# Patient Record
(demographics unavailable — no encounter records)

---

## 2017-02-05 NOTE — ERNOTE
ENT HPI


Date of Service: 17


Presenting Symptoms: dental pain, other


Time Seen by Provider: 17 20:33


Exam Limitations: no limitations





- Immun/Allergies/Home Medications


Immunizations: 





 IMMUNIZATION HX





Immunizations Up to Date         Yes


History of Influenza Vaccine     No


Hx Pneumococcal Vaccination      Yes








Allergies/Adverse Reactions: 


 Allergies











Allergy/AdvReac Type Severity Reaction Status Date / Time


 


NSAIDS (Non-Steroidal Allergy Severe Swelling Verified 10/01/16 16:02





Anti-Inflamma   of Tongue  


 


phenazopyridine HCl Allergy  Swelling Verified 10/01/16 16:02





[From Pyridium]   of Throat  


 


promethazine HCl Allergy  rash Verified 10/01/16 16:02





[From Phenergan]     


 


rivaroxaban [From Xarelto] Allergy   Verified 17 19:02


 


lorazepam [From Ativan] AdvReac  halucinatio Verified 10/01/16 16:02





   n  











Home Medications: 


HOME MEDICATIONS





Diphenoxylate HCl/Atrop Sulf [Lomotil] 2.5 mg PO QID PRN 16 [Last Taken 

Unknown]


Mv,Ca,Min/Iron Fum/FA/Vit K [Essential Woman Tablet] 1 each PO DAILY 16 [

Last Taken Unknown]


HYDROcodone/ACETAMINOPHEN [Norco 5-325] 1 - 2 tab PO Q6H PRN #10 tab 17 [

Last Taken Unknown]


Penicillin V Potassium [Pen-Vee K] 500 mg PO QID #40 tab 17 [Last Taken 

Unknown]


Prednisone [Deltasone] 10 mg PO DAILY 17 [Last Taken Unknown]











- History of Present Illness


Narrative: 





pain in tooth number 16 for three days. 








Review of Systems





- Review of Systems


Constitutional: Present: See HPI


EYE: Present: see HPI


ENT: Present: See HPI


Respiratory: Present: no symptoms reported


Cardiology: Present: no symptoms reported





- Patient's Past Medical History


Patient History - Medical: Kidney stone, UTI'S, Other


Patient History - Cardiac/Respiratory: Pneumonia


Patient History - Cancer: No Hx of Cancer


Patient History - Surgical Procedures: Appendectomy, Cholecystectomy, 

Hysterectomy


Patient History - Other: None





- Family History


  ** Mother


Family History - Medical: 


Family History - Cardiac/Respiratory: No pertinent hx





  ** Father


Family History - Medical: 


Family History - Cardiac/Respiratory: No pertinent hx





- Social History


Living Situations: home


Abuse History: No History of abuse


Psych History: No pertinent hx


Smoking Status: Never smoker


Alcohol Use: none


Drug Use: none





- Immunizations


Immunizations Up to Date: Yes


Hx Pneumococcal Vaccination: Yes


History of Influenza Vaccine: No





Physical Exam





- Physical Exam


General Appearance: Present: wd/wn, alert, no apparent distress


Ears, Nose, Throat: Present: other - pt does have swelling of the ginigiva in 

area of tooth number 17, 16. no fractures noted. poor dentition. missing tooth 

number 16 noted.


Neck: Present: normal inspection, nontender


Respiratory: Present: no respiratory distress, normal breath sounds, no 

accessory muscle use, chest nontender


Cardiovascular/Chest: Present: regular rate, rhythm, no murmur, normal 

peripheral pulses





ED Progress





- Vital Signs


Patient's Vital Signs:: I have reviewed the patient's vital signs.


Vital Signs: 





 Vital Signs











  17





  19:00


 


Temperature 37.1 C


 


Pulse Rate 97


 


Respiratory 18





Rate 


 


Blood Pressure 156/91


 


O2 Sat by Pulse 98





Oximetry 














- Progress/Reassessment


Chief Complaint: Dental Problem





Departure


Clinical Impression: 


 Tooth ache








- Departure


Disposition: Home self-care


Condition: Good


Instructions:  Dental Care and Dentist Visits


Referrals: 


Jesse Ceballos MD [Primary Care Provider] - 


Prescriptions: 


HYDROcodone/ACETAMINOPHEN [Norco 5-325] 1 - 2 tab PO Q6H PRN #10 tab


 PRN Reason: Pain


Penicillin V Potassium [Pen-Vee K] 500 mg PO QID #40 tab

## 2017-04-09 NOTE — XMS REPORT
Continuity of Care Document

 Created on:2017



Patient:Em Monge

Sex:Female

:1968

External Reference #:[id number]





Demographics







 Address  [address]



   [place] [address]

 

 Home Phone  [phone]

 

 Preferred Language  Unknown

 

 Marital Status  Unknown

 

 Restoration Affiliation  Taoism

 

 Race  White

 

 Ethnic Group  Unknown









Author







 Organization  [facility] ([facility])

 

 Address  [address] [address]

 

 Phone  [phone]









Support







 Name  Relationship  Address  Phone

 

 Unavailable    [address]  [phone]



     [place] [address]  

 

 Unavailable  Unrelated friend  [address]   [phone]



     [place] [address]  









Care Team Providers







 Name  Role  Phone

 

 Melanie Maxwell  Primary Care Provider  [phone]









Source Comments

This disclosure is being made pursuant to the Care Everywhere program,
applicable federal and state laws, and may not contain all informaitonavailable 
regarding this patient.[facility] ([facility])



Active Allergies and Adverse Reactions

No Active Allergies



Current Medications







 Prescription  Sig.  Disp.  Refills  Start Date  End Date  Status

 

 naproxen (NAPROSYN) 500 mg  take 500 mg by          Active



 tablet  mouth 2 times          



   daily.          

 

 ibuprofen (MOTRIN) 800 mg  take 800 mg by          Active



 tablet  mouth 3 times          



   daily.          







Active Problems







 Problem  Noted Date

 

 Lumbago  2004







Social History







 Tobacco Use  Types  Packs/Day  Years Used  Date

 

 Never Assessed        







Last Filed Vital Signs







 Vital Sign  Reading  Time Taken

 

 Blood Pressure  133/87  2009  2:31 PM CDT

 

 Pulse  63  2009  2:31 PM CDT

 

 Temperature  37 C (98.6 F)  2009  2:31 PM CDT

 

 Respiratory Rate  16  2009  2:31 PM CDT

 

 Height  1.524 m (5')  2009  2:31 PM CDT

 

 Weight  84.7 kg (186 lb 11.7 oz)  2009  2:31 PM CDT

 

 Body Mass Index  36.47  2009  2:31 PM CDT

 

 Oxygen Saturation  -  -







Plan of Care







 Health Maintenance  Due Date  Last Done  Comments

 

 Hepatitis B Vaccine (1 of 3 - Primary Series)  1968    

 

 Tdap Vaccine  1979    

 

 Lipid Disorder Screening  1986    

 

 MMR Vaccine  1986    

 

 Td Vaccine  1986    

 

 Cervical Cancer Screening  1998    

 

 Mammogram  2008    

 

 Influenza Vaccine: Seasonal (#1)  2016    







Results from Last 3 Months

Not on file

## 2017-05-23 NOTE — ERNOTE
Medical Problem HPI





- General


Chief Complaint: General Assessment


Time Seen by Provider: 17 21:46


Source: patient, family


Exam Limitations: no limitations





- Immun/Allergies/Home Medications


Immunizations: 





 IMMUNIZATION HX





Immunizations Up to Date         Yes


History of Influenza Vaccine     Yes


Hx Pneumococcal Vaccination      Yes








Allergies/Adverse Reactions: 


Allergies





NSAIDS (Non-Steroidal Anti-Inflamma Allergy (Severe, Verified 17 21:34)


 Swelling of Tongue


 I 


phenazopyridine HCl [From Pyridium] Allergy (Verified 17 21:34)


 Swelling of Throat


promethazine HCl [From Phenergan] Allergy (Verified 17 21:34)


 rash


rivaroxaban [From Xarelto] Allergy (Verified 17 21:34)


 


lorazepam [From Ativan] Adverse Reaction (Verified 17 21:34)


 halucination








Home Medications: 


HOME MEDICATIONS





Duloxetine HCl [Cymbalta] 30 mg PO DAILY 17 [Last Taken Unknown]


Multivit-Min/Iron Fum/Folic AC [Multi-Vitamin-Minerals Tablet] 1 each PO  [Last Taken Unknown]


Tramadol HCl [Rybix Odt] 50 mg PO DAILY 17 [Last Taken Unknown]











- History of Present History


Narrative: 


Pt states she has had fatigue and pain all over for 3 months.  She has been 

told she has fibromyalgia but all treatment that her doctor has tried have not 

helped.  


Timing: constant, getting worse


Severity: moderate





Review of Systems





- Review of Systems


Constitutional: Present: See HPI, weakness, fatigue.  Absent: recent illness


EYE: Present: no symptoms reported


ENT: Present: no symptoms reported


Respiratory: Present: shortness of breath - with any activity


Cardiology: Present: edema - in her hands that is minimal and usual for her.  

Absent: chest pain, palpitations


Gastrointestinal/Abdominal: Present: constipation - at times


Genitourinary: Present: no symptoms reported


Musculoskeletal: Present: See HPI


Skin: Absent: rash, lesions


Neurological: Absent: numbness, tingling


Endocrine: Absent: excessive sweating, flushing


Hematologic/Lymphatic: Absent: swollen glands


Psych: Present: no symptoms reported





- Patient's Past Medical History


Patient History - Medical: Fibromyalgia, Kidney stone, UTI'S, Other


Patient History - Cardiac/Respiratory: Pneumonia


Patient History - Cancer: No Hx of Cancer


Patient History - Surgical Procedures: Appendectomy, Cholecystectomy, 

Hysterectomy


Patient History - Other: None





- Family History


  ** Mother


Family History - Medical: 


Family History - Cardiac/Respiratory: No pertinent hx





  ** Father


Family History - Medical: 


Family History - Cardiac/Respiratory: No pertinent hx





- Social History


Living Situations: significant other


Abuse History: No History of abuse


Psych History: Hx of Depression


Smoking Status: Never smoker


Alcohol Use: none


Drug Use: none





- Immunizations


Immunizations Up to Date: Yes


Hx Pneumococcal Vaccination: Yes


History of Influenza Vaccine: Yes





Physical Exam





- Physical Exam


General Appearance: Present: wd/wn, alert, no apparent distress


Eye Exam: Normal inspection: bilateral, PERRL: bilateral, EOMI: bilateral


Ears, Nose, Throat: Present: normal ENT inspection, normal pharynx


Neck: Present: normal inspection, nontender


Respiratory: Present: no respiratory distress, normal breath sounds, lungs clear


Cardiovascular/Chest: Present: regular rate, rhythm, no murmur, normal 

peripheral pulses


Gastrointestinal/Abdominal: Present: nondistended, soft


Extremity Exam: Present: normal inspection, normal range of motion, no edema


Neurological Exam: Present: alert, oriented, normal mood/affect


DTR: N=norm/NB=norm/brisk/A=abs/DD=dull/dimin/HC=hyperactive: Bicep (R): Normal

, Bicep (L): Normal, Knee (R): Normal, Knee (L): Normal, Ankle (R): Normal, 

Ankle (L): Normal


Skin Exam: Present: normal color, warm/dry


Lymphatic Exam: Present: no adenopathy





ED Progress





- Results and Orders


Patient's Lab Results:: I have reviewed the patient's lab results.


Results and Orders: 





 Laboratory Tests











  17





  22:05 22:05 22:05


 


WBC  10.4  


 


Hgb  14.4  


 


Hct  43.5  


 


Plt Count  493 H  


 


ESR   37 H 


 


Sodium    145 H


 


Potassium    4.0


 


Chloride    108 H


 


Carbon Dioxide    25.9


 


Anion Gap    15.1 H


 


BUN    17


 


Creatinine    0.97


 


Est GFR (Non-Af Amer)    65  D


 


BUN/Creatinine Ratio    17.5


 


Random Glucose    115 H


 


Calcium    9.2


 


Calcium Adj for Albumin    9.3


 


Total Bilirubin    0.2


 


AST    20


 


ALT    36


 


Alkaline Phosphatase    119


 


C-Reactive Prot, Quant    Less than 0.2


 


Total Protein    7.9


 


Albumin    3.5


 


TSH    0.447














- Vital Signs


Patient's Vital Signs:: I have reviewed the patient's vital signs.


Vital Signs: 





 Vital Signs











  17





  21:26


 


Temperature 36.5 C


 


Pulse Rate 128 H


 


Respiratory 26 H





Rate 


 


Blood Pressure 119/75


 


O2 Sat by Pulse 99





Oximetry 














- Progress/Reassessment


Chief Complaint: General Assessment





Departure





- Departure


Clinical Impression: 


 Myalgia, Elevated sed rate





Disposition: Home Follow Up Needed


Condition: Fair


Instructions:  Muscle Pain, Adult


Additional Instructions: 


See your regular doctor to have him follow up on the elevated sedimentation 

rate.  Continue to do lifestyle things that may help your pain


Referrals: 


Jesse Ceballos MD [Primary Care Provider] -

## 2017-05-23 NOTE — XMS REPORT
Continuity of Care Document

 Created on:May 23, 2017



Patient:Em Monge

Sex:Female

:1968

External Reference #:[id number]





Demographics







 Address  [address]



   [place] [address]

 

 Home Phone  [phone]

 

 Preferred Language  Unknown

 

 Marital Status  Unknown

 

 Holiness Affiliation  Anabaptism

 

 Race  White

 

 Ethnic Group  Unknown









Author







 Organization  [facility] ([facility])

 

 Address  [address]



   [place] [address]

 

 Phone  [phone]









Support







 Name  Relationship  Address  Phone

 

 Unavailable    [address]  [phone]



     [place] [address]  

 

 Unavailable  Unrelated friend  [address]   [phone]



     [place] [address]  









Care Team Providers







 Name  Role  Phone

 

 Melanie Maxwell  Primary Care Provider  [phone]









Source Comments

This disclosure is being made pursuant to the Care Everywhere program,
applicable federal and state laws, and may not contain all informaitonavailable 
regarding this patient.[facility] ([facility])



Active Allergies and Adverse Reactions

No Active Allergies



Current Medications







 Prescription  Sig.  Disp.  Refills  Start Date  End Date  Status

 

 naproxen (NAPROSYN) 500 mg  take 500 mg by          Active



 tablet  mouth 2 times          



   daily.          

 

 ibuprofen (MOTRIN) 800 mg  take 800 mg by          Active



 tablet  mouth 3 times          



   daily.          







Active Problems







 Problem  Noted Date

 

 Lumbago  2004







Social History







 Tobacco Use  Types  Packs/Day  Years Used  Date

 

 Never Assessed        







Last Filed Vital Signs







 Vital Sign  Reading  Time Taken

 

 Blood Pressure  133/87  2009  2:31 PM CDT

 

 Pulse  63  2009  2:31 PM CDT

 

 Temperature  37 C (98.6 F)  2009  2:31 PM CDT

 

 Respiratory Rate  16  2009  2:31 PM CDT

 

 Height  1.524 m (5')  2009  2:31 PM CDT

 

 Weight  84.7 kg (186 lb 11.7 oz)  2009  2:31 PM CDT

 

 Body Mass Index  36.47  2009  2:31 PM CDT

 

 Oxygen Saturation  -  -







Plan of Care







 Health Maintenance  Due Date  Last Done  Comments

 

 Hepatitis B Vaccine (1 of 3 - Primary Series)  1968    

 

 Tdap Vaccine  1979    

 

 Lipid Disorder Screening  1986    

 

 MMR Vaccine  1986    

 

 Td Vaccine  1986    

 

 Cervical Cancer Screening  1998    

 

 Mammogram  2008    

 

 Influenza Vaccine: Seasonal (#1)  2016    







Results from Last 3 Months

Not on file

## 2017-05-23 NOTE — XMS REPORT
Continuity of Care Document

 Created on:May 23, 2017



Patient:Em Monge

Sex:Female

:1968

External Reference #:[id number]





Demographics







 Address  [address] [place], [place] [address]

 

 Home Phone  [phone]

 

 Mobile Phone  [phone]

 

 Preferred Language  English

 

 Marital Status  Non- or 

 

 Cheondoism Affiliation  Unknown

 

 Race  White or 

 

 Ethnic Group  Non- or 









Author







 Organization  [facility]

 

 Address  Unavailable



   [place] [address]









Support







 Name  Relationship  Address  Phone

 

 Unavailable  Unavailable  Unavailable  [phone]









Care Team Providers







 Name  Role  Phone

 

 Jesse Ceballos  Primary Care Provider  [phone]









Source Comments

This disclosure is being made pursuant to the [employer] program and maynot 
contain all information available regarding this patient.[facility]



Active Allergies and Adverse Reactions







 Allergen  Noted Date  Severity  Reactions  Comments

 

 Nsaids  2017  Low  Nausea Only  

 

 Phenergan  2017    Unknown  

 

 Pyridium  2017    Unknown  

 

 Voltaren  2017  Low  Rash  







Current Medications

Be aware that medications may not be up to date as of this document.  
Alwaysverify current medications with the patient.







 Prescription  Sig.  Disp.  Refills  Start Date  End Date  Status

 

 multivitamins with  Take 1 each by          Active



 minerals  mouth daily.          



 (MULTIVITAL-M) TABS            

 

 DULoxetine  Take 1 capsule  30 capsule  2  2017    Active



 (CYMBALTA) 30 MG  by mouth          



 capsule  daily.          

 

 traMADol (ULTRAM)  Take 1 tablet  90 tablet  0  2017    Active



 50 MG tablet  (50 mg total)          



   by mouth every          



   8 (eight)          



   hours as          



   needed for          



   Pain.          

 

 diphenoxylate-atrop  Take 1 tablet  30 tablet  1  2017    Active



 ine (LOMOTIL)  by mouth 4          



 2.5-0.025 MG per  (four) times          



 tablet  daily as          



   needed for          



   Diarrhea          



   Indications:          



   Diarrhea.          

 

 diphenoxylate-atrop  Take 1 tablet  30 tablet  1  2017  
Discontinued



 ine (LOMOTIL)  by mouth 4        7  



 2.5-0.025 MG per  (four) times          



 tablet  daily as          



   needed for          



   Diarrhea          



   Indications:          



   Diarrhea.          







Active Problems

Not on file



Most Recent Encounters







 Date  Type  Specialty  Providers  Description

 

 2017  Refill  Orthopedic Surgery  Andreina Dave RMA  

 

 2017  Office Visit  Family Medicine  Jesse Ceballos,  Body aches (
Primary



       MD  Dx); Fibromyalgia



         affecting shoulder



         region

 

 2017  Office Visit  Family Medicine  Jesse Ceballos,  Fibromyalgia 
affecting



       MD  shoulder region



         (Primary Dx)

 

 2017  Abstract  Family Medicine  Lupe Darnell, CMA  

 

 2017  Refill  Family Medicine  Jesse Ceballos MD  







Social History







 Tobacco Use  Types  Packs/Day  Years Used  Date

 

 Never Smoker        









 Smokeless Tobacco: Never Used      









 Alcohol Use  Drinks/Week  oz/Week  Comments

 

 No      







Last Filed Vital Signs







 Vital Sign  Reading  Time Taken

 

 Blood Pressure  173/109  2017  3:01 PM CDT

 

 Pulse  70  2017  3:01 PM CDT

 

 Temperature  36.3 C (97.4 F)  2017  3:01 PM CDT

 

 Respiratory Rate  16  2017  3:01 PM CDT

 

 Height  1.524 m (5')  2017  3:01 PM CDT

 

 Weight  84.006 kg (185 lb 3.2 oz)  2017  3:01 PM CDT

 

 Body Mass Index  36.17  2017  3:01 PM CDT

 

 Oxygen Saturation  98%  2017  3:01 PM CDT







Plan of Care







 Health Maintenance  Due Date  Last Done  Comments

 

 Tetanus/Pertussis (1 - Tdap)  1987    

 

 Pap Smear  1989    

 

 Influenza Immunization (#1)  2016    







Results from Last 3 Months

Not on file

## 2017-05-24 NOTE — XMS REPORT
Continuity of Care Document

 Created on:May 24, 2017



Patient:Em Monge

Sex:Female

:1968

External Reference #:[id number]





Demographics







 Address  [address]



   [place] [address]

 

 Home Phone  [phone]

 

 Preferred Language  Unknown

 

 Marital Status  Unknown

 

 Latter-day Affiliation  Oriental orthodox

 

 Race  White

 

 Ethnic Group  Unknown









Author







 Organization  [facility] ([facility])

 

 Address  [address]



   [place] [address]

 

 Phone  [phone]









Support







 Name  Relationship  Address  Phone

 

 Unavailable    [address]  [phone]



     [place] [address]  

 

 Unavailable  Unrelated friend  [address]   [phone]



     [place] [address]  









Care Team Providers







 Name  Role  Phone

 

 Melanie Maxwell  Primary Care Provider  [phone]









Source Comments

This disclosure is being made pursuant to the Care Everywhere program,
applicable federal and state laws, and may not contain all informaitonavailable 
regarding this patient.[facility] ([facility])



Active Allergies and Adverse Reactions

No Active Allergies



Current Medications







 Prescription  Sig.  Disp.  Refills  Start Date  End Date  Status

 

 naproxen (NAPROSYN) 500 mg  take 500 mg by          Active



 tablet  mouth 2 times          



   daily.          

 

 ibuprofen (MOTRIN) 800 mg  take 800 mg by          Active



 tablet  mouth 3 times          



   daily.          







Active Problems







 Problem  Noted Date

 

 Lumbago  2004







Social History







 Tobacco Use  Types  Packs/Day  Years Used  Date

 

 Never Assessed        







Last Filed Vital Signs







 Vital Sign  Reading  Time Taken

 

 Blood Pressure  133/87  2009  2:31 PM CDT

 

 Pulse  63  2009  2:31 PM CDT

 

 Temperature  37 C (98.6 F)  2009  2:31 PM CDT

 

 Respiratory Rate  16  2009  2:31 PM CDT

 

 Height  1.524 m (5')  2009  2:31 PM CDT

 

 Weight  84.7 kg (186 lb 11.7 oz)  2009  2:31 PM CDT

 

 Body Mass Index  36.47  2009  2:31 PM CDT

 

 Oxygen Saturation  -  -







Plan of Care







 Health Maintenance  Due Date  Last Done  Comments

 

 Hepatitis B Vaccine (1 of 3 - Primary Series)  1968    

 

 Tdap Vaccine  1979    

 

 Lipid Disorder Screening  1986    

 

 MMR Vaccine  1986    

 

 Td Vaccine  1986    

 

 Cervical Cancer Screening  1998    

 

 Mammogram  2008    

 

 Influenza Vaccine: Seasonal (#1)  2016    







Results from Last 3 Months

Not on file

## 2017-05-24 NOTE — XMS REPORT
Continuity of Care Document

 Created on:May 24, 2017



Patient:Em Monge

Sex:Female

:1968

External Reference #:[id number]





Demographics







 Address  [address] [place], [place] [address]

 

 Home Phone  [phone]

 

 Mobile Phone  [phone]

 

 Preferred Language  English

 

 Marital Status  Non- or 

 

 Anabaptism Affiliation  Unknown

 

 Race  White or 

 

 Ethnic Group  Non- or 









Author







 Organization  [employer]

 

 Address  Unavailable



   [place] [address]









Support







 Name  Relationship  Address  Phone

 

 Unavailable  Unavailable  Unavailable  [phone]









Care Team Providers







 Name  Role  Phone

 

 Jesse Ceballos  Primary Care Provider  [phone]









Source Comments

This disclosure is being made pursuant to the [employer] program and maynot 
contain all information available regarding this patient.[facility]



Active Allergies and Adverse Reactions







 Allergen  Noted Date  Severity  Reactions  Comments

 

 Nsaids  2017  Low  Nausea Only  

 

 Phenergan  2017    Unknown  

 

 Pyridium  2017    Unknown  

 

 Voltaren  2017  Low  Rash  







Current Medications

Be aware that medications may not be up to date as of this document.  
Alwaysverify current medications with the patient.







 Prescription  Sig.  Disp.  Refills  Start Date  End Date  Status

 

 multivitamins with  Take 1 each by          Active



 minerals  mouth daily.          



 (MULTIVITAL-M) TABS            

 

 DULoxetine  Take 1 capsule  30 capsule  2  2017    Active



 (CYMBALTA) 30 MG  by mouth          



 capsule  daily.          

 

 traMADol (ULTRAM)  Take 1 tablet  90 tablet  0  2017    Active



 50 MG tablet  (50 mg total)          



   by mouth every          



   8 (eight)          



   hours as          



   needed for          



   Pain.          

 

 diphenoxylate-atrop  Take 1 tablet  30 tablet  1  2017    Active



 ine (LOMOTIL)  by mouth 4          



 2.5-0.025 MG per  (four) times          



 tablet  daily as          



   needed for          



   Diarrhea          



   Indications:          



   Diarrhea.          

 

 diphenoxylate-atrop  Take 1 tablet  30 tablet  1  2017  
Discontinued



 ine (LOMOTIL)  by mouth 4        7  



 2.5-0.025 MG per  (four) times          



 tablet  daily as          



   needed for          



   Diarrhea          



   Indications:          



   Diarrhea.          







Active Problems

Not on file



Most Recent Encounters







 Date  Type  Specialty  Providers  Description

 

 2017  Refill  Orthopedic Surgery  Andreina Dave RMA  

 

 2017  Office Visit  Family Medicine  Jesse Ceballos,  Body aches (
Primary



       MD  Dx); Fibromyalgia



         affecting shoulder



         region

 

 2017  Office Visit  Family Medicine  Jesse Ceballos,  Fibromyalgia 
affecting



       MD  shoulder region



         (Primary Dx)

 

 2017  Abstract  Family Medicine  Lupe Darnell, CRISTY  

 

 2017  Refill  Family Medicine  Jesse Ceballos MD  







Social History







 Tobacco Use  Types  Packs/Day  Years Used  Date

 

 Never Smoker        









 Smokeless Tobacco: Never Used      









 Alcohol Use  Drinks/Week  oz/Week  Comments

 

 No      







Last Filed Vital Signs







 Vital Sign  Reading  Time Taken

 

 Blood Pressure  173/109  2017  3:01 PM CDT

 

 Pulse  70  2017  3:01 PM CDT

 

 Temperature  36.3 C (97.4 F)  2017  3:01 PM CDT

 

 Respiratory Rate  16  2017  3:01 PM CDT

 

 Height  1.524 m (5')  2017  3:01 PM CDT

 

 Weight  84.006 kg (185 lb 3.2 oz)  2017  3:01 PM CDT

 

 Body Mass Index  36.17  2017  3:01 PM CDT

 

 Oxygen Saturation  98%  2017  3:01 PM CDT







Plan of Care







 Date  Type  Specialty  Providers  Description

 

 2017  Appointment  Family Medicine  Jesse Ceballos MD



  



       [address] [place] [id number]



  



       [phone]



  



       [phone] (Fax)  









 Health Maintenance  Due Date  Last Done  Comments

 

 Tetanus/Pertussis (1 - Tdap)  1987    

 

 Pap Smear  1989    

 

 Influenza Immunization (#1)  2016    







Results from Last 3 Months

Not on file

## 2017-07-22 NOTE — ERNOTE
Back Pain ER HPI


Date of Service: 17


Presenting Symptoms: injury/pain to back


Time Seen by Provider: 17 17:29


Source: patient, family, RN notes reviewed


Exam Limitations: no limitations


Immunizations: 





 IMMUNIZATION HX





Immunizations Up to Date         Yes


History of Influenza Vaccine     Yes


Hx Pneumococcal Vaccination      Yes








Allergies/Adverse Reactions: 


Allergies





NSAIDS (Non-Steroidal Anti-Inflamma Allergy (Severe, Verified 17 17:19)


 Swelling of Tongue


 I 


phenazopyridine HCl [From Pyridium] Allergy (Verified 17 17:19)


 Swelling of Throat


promethazine HCl [From Phenergan] Allergy (Verified 17 17:19)


 rash


rivaroxaban [From Xarelto] Allergy (Verified 17 17:19)


 


lorazepam [From Ativan] Adverse Reaction (Verified 17 17:19)


 halucination








Home Medications: 


HOME MEDICATIONS





Multivit-Min/Iron Fum/Folic AC [Multi-Vitamin-Minerals Tablet] 1 each PO DAILY 

17 [Last Taken Unknown]


Acetaminophen [Tylenol] 1,000 mg PO TID 17 [Last Taken Unknown]


Pregabalin [Lyrica] 150 mg PO TID 17 [Last Taken Unknown]


traMADol HCL [Ultram] 50 mg PO Q6H PRN #20 tablet 17 [Last Taken Unknown]








Narrative: 





Em is a 48-year-old female brought to the emergency department by her fianc 

for pain in her tailbone that began yesterday without incident.  She reports 

fracturing her coccyx several years ago.  It periodically bothers her and 

causes severe pain.  She has been taking Tylenol without any improvement.  She 

has also been using icy hot.  She denies any numbness, tingling or weakness in 

her extremities.  She also denies any incontinence or difficulty urinating.  

Her pain is the same as what she has experienced in the past.  She reports the 

tramadol along with the Tylenol has helped before.


Date (Duration): 17


Location of pain: Reports: no radiation, other - Coccyx


Activities at Onset: Reports: none


Recent Injury?: Reports: no


Possible Precipitating Factor: Reports: none


Modifying Factors - (Improves): Reports: nothing


Modifying Factors - (Worsens): Reports: other - Sitting


Associated Symptoms: Denies: fever/chills, sweating, constipation/incontinence, 

nausea/vomiting, problems urinating, difficulty walking, numbess/weakness in 

legs


Prior Treament: Reports: recently seen, similar symptoms before





Review of Systems





- Review of Systems


Constitutional: Absent: recent illness, fever, chills


EYE: Present: no symptoms reported


ENT: Present: no symptoms reported


Respiratory: Absent: shortness of breath, cough


Cardiology: Absent: chest pain, edema


Gastrointestinal/Abdominal: Absent: nausea, vomiting, abdominal pain


Genitourinary: Absent: dysuria, hematuria


Musculoskeletal: Absent: muscle pain, joint pain


Skin: Absent: rash, lesions, lumps


Neurological: Absent: weakness, numbness, tingling


Endocrine: Present: no symptoms reported


Hematologic/Lymphatic: Present: no symptoms reported


Psych: Present: no symptoms reported





- Patient's Past Medical History


Patient History - Medical: Chronic Pain, Fibromyalgia, Kidney stone, UTI'S, 

Other


Patient History - Cardiac/Respiratory: Pneumonia


Patient History - Cancer: No Hx of Cancer


Patient History - Surgical Procedures: Appendectomy, Cholecystectomy, 

Hysterectomy


Patient History - Other: None





- Family History


  ** Mother


Family History - Medical: 


Family History - Cardiac/Respiratory: No pertinent hx





  ** Father


Family History - Medical: 


Family History - Cardiac/Respiratory: No pertinent hx





- Social History


Living Situations: spouse


Abuse History: No History of abuse


Psych History: Hx of Depression


Smoking Status: Never smoker


Alcohol Use: none


Drug Use: none





- Immunizations


Immunizations Up to Date: Yes


Hx Pneumococcal Vaccination: Yes


History of Influenza Vaccine: Yes





Physical Exam





- Physical Exam


General Appearance: Present: wd/wn, alert, mild distress, obese


Neck: Present: normal inspection, nontender, supple


Respiratory: Present: no respiratory distress, normal breath sounds, no 

accessory muscle use, lungs clear


Cardiovascular/Chest: Present: regular rate, rhythm, no murmur, normal 

peripheral pulses


Gastrointestinal/Abdominal: Present: nontender, nondistended, soft


Back Exam: Present: normal range of motion, no CVA tenderness, vertebral 

tenderness - Coccyx


Extremity Exam: Present: normal inspection, normal range of motion, no edema


Neurological Exam: Present: alert, oriented, normal mood/affect, no motor/

sensory deficits


Skin Exam: Present: normal color, warm/dry





ED Progress





- Vital Signs


Patient's Vital Signs:: I have reviewed the patient's vital signs.


Vital Signs: 





 Vital Signs











  07/22/17 07/22/17





  17:13 17:28


 


Temperature 36.7 C 


 


Pulse Rate 90 98


 


Respiratory 18 





Rate  


 


Blood Pressure 150/97 148/101


 


O2 Sat by Pulse 97 93





Oximetry  














- Progress/Reassessment


Chief Complaint: Back Pain


Progress:: Improved





Departure


Clinical Impression: 


 Coccyalgia








- Departure


Disposition: Home Follow Up Needed


Condition: Stable


Instructions:  Tailbone Injury, Easy-to-Read


Additional Instructions: 


Continue Tylenol


Ice to sore area periodically


Follow up with your doctor if symptoms continue


Referrals: 


Lukasz Zuniga DO [Primary Care Provider] - 


Prescriptions: 


traMADol HCL [Ultram] 50 mg PO Q6H PRN #20 tablet


 PRN Reason: Pain

## 2017-07-22 NOTE — XMS REPORT
Continuity of Care Document

 Created on:2017



Patient:Kathy Bolanos

Sex:Female

:1968

External Reference #:[id number]





Demographics







 Address  [address] [place] [address]

 

 Home Phone  [phone]

 

 Mobile Phone  [phone]

 

 Preferred Language  English

 

 Marital Status  Non- or 

 

 Restorationist Affiliation  Unknown

 

 Race  White or 

 

 Ethnic Group  Non- or 









Author







 Organization  [employer]

 

 Address  Unavailable



   [place] [address]









Support







 Name  Relationship  Address  Phone

 

 Unavailable  Unavailable  Unavailable  [phone]









Care Team Providers







 Name  Role  Phone

 

 Jesse Ceballos  Primary Care Provider  [phone]









Source Comments

This disclosure is being made pursuant to the [employer] program and maynot 
contain all information available regarding this patient.[facility]



Active Allergies and Adverse Reactions







 Allergen  Noted Date  Severity  Reactions  Comments

 

 Nsaids  2017  Low  Nausea Only  

 

 Phenergan  2017    Unknown  

 

 Pyridium  2017    Unknown  

 

 Voltaren  2017  Low  Rash  







Current Medications

Be aware that medications may not be up to date as of this document.  
Alwaysverify current medications with the patient.







 Prescription  Sig.  Disp.  Refills  Start Date  End Date  Status

 

 multivitamins with  Take 1 each by          Active



 minerals  mouth daily.          



 (MULTIVITAL-M) TABS            

 

 hydrOXYzine  Take 1 tablet  40 tablet  0  2017    Active



 (ATARAX) 25 MG  by mouth 2          



 tablet  (two) times          



   daily as          



   needed for          



   Anxiety.          

 

 diphenoxylate-atrop  Take 1 tablet  30 tablet  1  07/10/2017    Active



 ine (LOMOTIL)  by mouth 4          



 2.5-0.025 MG per  (four) times          



 tablet  daily as          



   needed for          



   Diarrhea          



   Indications:          



   Diarrhea.          

 

 pregabalin (LYRICA)  Take 1 capsule  90 capsule  1  2017    Active



 200 MG capsule  (200 mg total)          



   by mouth 3          



   (three) times          



   daily.          

 

 traMADol (ULTRAM)  Take 1 tablet  90 tablet  0  2017  
Discontinued



 50 MG tablet  (50 mg total)        7  



   by mouth every          



   8 (eight)          



   hours as          



   needed for          



   Pain.          

 

 diphenoxylate-atrop  Take 1 tablet  30 tablet  1  2017  07/10/201  
Discontinued



 ine (LOMOTIL)  by mouth 4        7  



 2.5-0.025 MG per  (four) times          



 tablet  daily as          



   needed for          



   Diarrhea          



   Indications:          



   Diarrhea.          

 

 pregabalin (LYRICA)  Take 1 capsule  90 capsule  1  06/15/2017  07/11/201  
Discontinued



 150 MG capsule  (150 mg total)        7  



   by mouth 3          



   (three) times          



   daily.          







Active Problems







 Problem  Noted Date

 

 Fibromyalgia  2017







Most Recent Encounters







 Date  Type  Specialty  Providers  Description

 

 2017  Office Visit  Family Medicine  Lukasz Zuniga, DO  Fibromyalgia (
Primary



         Dx)

 

 07/10/2017  Orders Only  Family Medicine  Najma Almodovar CMA  Diarrhea, 
unspecified



         type (Primary Dx)

 

 2017  Office Visit  Family Medicine  Lukasz Zuniga, DO  Cough (Primary 
Dx);



         Fibromyalgia

 

 06/15/2017  Office Visit  Family Medicine  Lukasz Zuniga, DO  Fibromyalgia (
Primary



         Dx)

 

 2017  Refill  Family Medicine  Joan Hampton RN  

 

 2017  Telephone  Family Medicine  Nay Mcdermott CMA  

 

 2017  Orders Only    Provider, Not In  



       System  

 

 2017  Office Visit  Family Medicine  Lukasz Zuniga, DO  Fibromyalgia (
Primary



         Dx); Anxiety

 

 2017  Office Visit  Family Medicine  Lukasz Zuniga, DO  Fibromyalgia (
Primary



         Dx)

 

 2017  Office Visit  Family Medicine  Jesse Ceballos,  Fibromyalgia (
Primary



       MD  Dx); Chronic



         generalized pain

 

 2017  Refill  Orthopedic Surgery  Andreina Dave RMA  

 

 2017  Office Visit  Family Jesse Pierce,  Body aches (
Primary



       MD  Dx); Fibromyalgia



         affecting shoulder



         region







Social History







 Tobacco Use  Types  Packs/Day  Years Used  Date

 

 Never Smoker        









 Smokeless Tobacco: Never Used      









 Alcohol Use  Drinks/Week  oz/Week  Comments

 

 No      







Last Filed Vital Signs







 Vital Sign  Reading  Time Taken

 

 Blood Pressure  118/80  2017 11:00 AM CDT

 

 Pulse  59  2017 11:00 AM CDT

 

 Temperature  36.2 C (97.2 F)  06/15/2017 11:01 AM CDT

 

 Respiratory Rate  16  2017 11:00 AM CDT

 

 Height  1.524 m (5')  06/15/2017 11:01 AM CDT

 

 Weight  86.183 kg (190 lb)  2017 11:00 AM CDT

 

 Body Mass Index  37.11  2017 11:00 AM CDT

 

 Oxygen Saturation  100%  2017 11:00 AM CDT







Plan of Care







 Date  Type  Specialty  Providers  Description

 

 2017  Appointment  Family Medicine  Nadia Lukasz CASAREZ 



  



       [address] [place] [address]



  



       [phone] (Fax)  









 Health Maintenance  Due Date  Last Done  Comments

 

 Tetanus/Pertussis (1 - Tdap)  1987    

 

 Pap Smear  1989    

 

 Influenza Immunization (#1)  2017    







Results from Last 3 Months

Laboratory Miscellaneous (2017)Comprehensive metabolic panel (2017)
CBC auto differential (2017)





 Component  Value  Range

 

 WBC Count  10.4  10^3/mL

 

 RBC  4.99  10^6/L

 

 Hemoglobin  14.4  g/dL

 

 Hematocrit  43.5  %

 

 Platelets  493  K/L









 Specimen

 

 BLOOD









 Narrative

 

 See scanned results 17







Insurance







 Payer  Benefit Plan / Group  Subscriber ID  Type  Phone  Address

 

 [facility]  [id number]  PPO  [phone]  [address] [facility] [address]







           [place]



           [address]









 Guarantor Name  Account Type  Relation to  Date of  Phone  Billing



     Patient  Birth    Address

 

 KATHY BOLANOS  Personal/Family  Self  1968  Home:  [address] [facility] [address]







         [phone]  [place]



           [address]

## 2017-08-17 NOTE — ERNOTE
ENT HPI


Date of Service: 17


Time Seen by Provider: 17 19:57





- Immun/Allergies/Home Medications


Immunizations: 





 IMMUNIZATION HX





Immunizations Up to Date         Yes


History of Influenza Vaccine     No


Hx Pneumococcal Vaccination      Yes








Allergies/Adverse Reactions: 


 Allergies











Allergy/AdvReac Type Severity Reaction Status Date / Time


 


NSAIDS (Non-Steroidal Allergy Severe Swelling Verified 17 17:19





Anti-Inflamma   of Tongue  


 


phenazopyridine HCl Allergy  Swelling Verified 17 17:19





[From Pyridium]   of Throat  


 


promethazine HCl Allergy  rash Verified 17 17:19





[From Phenergan]     


 


rivaroxaban [From Xarelto] Allergy   Verified 17 17:19


 


lorazepam [From Ativan] AdvReac  halucinatio Verified 17 17:19





   n  











Home Medications: 


HOME MEDICATIONS





Multivit-Min/Iron Fum/Folic AC [Multi-Vitamin-Minerals Tablet] 1 each PO DAILY 

17 [Last Taken Unknown]


Acetaminophen [Tylenol] 1,000 mg PO TID 17 [Last Taken Unknown]


Pregabalin [Lyrica] 150 mg PO TID 17 [Last Taken Unknown]


Clindamycin HCl [Cleocin HCl] 300 mg PO Q6H #40 capsule 17 [Last Taken 

Unknown]











- History of Present Illness


Narrative: 





Pt. comes in with c/o R maxillary pain that started this morning.  Pt. states 

that the pain radiates to her R ear.  Pt. denies any SOB, CP, NVD, fever, 

recent illness or injury.  Pt. denies any prehospital treatment alleviating 

factors but statest aht eating exacerbates the pain.





Review of Systems





- Review of Systems


Constitutional: Present: no symptoms reported.  Absent: recent illness, fever, 

chills, weakness, fatigue, malaise


EYE: Present: no symptoms reported


ENT: Present: other - R maxillary tooth pain


Respiratory: Present: no symptoms reported.  Absent: shortness of breath, cough

, wheezing


Cardiology: Present: no symptoms reported.  Absent: chest pain, palpitations, 

edema


Gastrointestinal/Abdominal: Present: no symptoms reported


Genitourinary: Present: no symptoms reported


Musculoskeletal: Present: no symptoms reported.  Absent: back pain, joint pain


Skin: Present: no symptoms reported


Neurological: Present: no symptoms reported.  Absent: headache, dizziness/light-

headedness, numbness, tingling


All Other Systems: All systems neg except as marked





- Patient's Past Medical History


Patient History - Medical: Chronic Pain, Fibromyalgia, Kidney stone, UTI'S, 

Other


Patient History - Cardiac/Respiratory: Pneumonia


Patient History - Cancer: No Hx of Cancer


Patient History - Surgical Procedures: Appendectomy, Cholecystectomy, 

Hysterectomy


Patient History - Other: None





- Family History


  ** Mother


Family History - Medical: 


Family History - Cardiac/Respiratory: No pertinent hx





  ** Father


Family History - Medical: 


Family History - Cardiac/Respiratory: No pertinent hx





- Social History


Living Situations: home


Abuse History: No History of abuse


Psych History: Hx of Depression


Smoking Status: Never smoker


Alcohol Use: none


Drug Use: none





- Immunizations


Immunizations Up to Date: Yes


Hx Pneumococcal Vaccination: Yes


History of Influenza Vaccine: No





Physical Exam





- Physical Exam


General Appearance: Present: wd/wn, alert, no apparent distress


Head Exam: Present: normal inspection, no evidence of injury


Eye Exam: Normal inspection: bilateral, PERRL: bilateral, EOMI: bilateral


Ears, Nose, Throat: Present: normal except -, normal pharynx, other - maxillary 

dental caries into pulp with abscess noted at gum line 0.2cm in diameter


Neck: Present: normal inspection, nontender.  Absent: lymphadenopathy (R), 

lymphadenopathy (L)


Respiratory: Present: no respiratory distress, normal breath sounds, no 

accessory muscle use, chest nontender, lungs clear


Cardiovascular/Chest: Present: regular rate, rhythm, no murmur, normal 

peripheral pulses


Neurological Exam: Present: alert, oriented, normal mood/affect, no motor/

sensory deficits


Skin Exam: Present: normal color, warm/dry.  Absent: pallor, skin rash





ED Progress





- Vital Signs


Patient's Vital Signs:: I have reviewed the patient's vital signs.


Vital Signs: 





 Vital Signs











  17





  19:25


 


Temperature 37.2 C


 


Pulse Rate 89


 


Respiratory 18





Rate 


 


Blood Pressure 141/109


 


O2 Sat by Pulse 100





Oximetry 














- Progress/Reassessment


Chief Complaint: Dental Problem





Departure


Clinical Impression: 


 Pain due to dental caries, Dental abscess








- Departure


Disposition: Home self-care


Condition: Good


Instructions:  Dental Abscess, Easy-to-Read


Additional Instructions: 


Please take tylenol every 6 hours and use lidocaine every 2 hours apply to 

teeth for pain.


Referrals: 


Lukasz Zuniga DO [Primary Care Provider] - 


Prescriptions: 


Clindamycin HCl [Cleocin HCl] 300 mg PO Q6H #40 capsule